# Patient Record
Sex: FEMALE | Race: WHITE
[De-identification: names, ages, dates, MRNs, and addresses within clinical notes are randomized per-mention and may not be internally consistent; named-entity substitution may affect disease eponyms.]

---

## 2018-03-01 ENCOUNTER — HOSPITAL ENCOUNTER (EMERGENCY)
Dept: HOSPITAL 58 - ED | Age: 68
Discharge: HOME | End: 2018-03-01

## 2018-03-01 VITALS — DIASTOLIC BLOOD PRESSURE: 75 MMHG | TEMPERATURE: 98.8 F | SYSTOLIC BLOOD PRESSURE: 122 MMHG

## 2018-03-01 VITALS — BODY MASS INDEX: 31.8 KG/M2

## 2018-03-01 DIAGNOSIS — F17.210: ICD-10-CM

## 2018-03-01 DIAGNOSIS — K52.9: ICD-10-CM

## 2018-03-01 DIAGNOSIS — E86.0: Primary | ICD-10-CM

## 2018-03-01 PROCEDURE — 87899 AGENT NOS ASSAY W/OPTIC: CPT

## 2018-03-01 PROCEDURE — 93010 ELECTROCARDIOGRAM REPORT: CPT

## 2018-03-01 PROCEDURE — 36415 COLL VENOUS BLD VENIPUNCTURE: CPT

## 2018-03-01 PROCEDURE — 87186 SC STD MICRODIL/AGAR DIL: CPT

## 2018-03-01 PROCEDURE — 87502 INFLUENZA DNA AMP PROBE: CPT

## 2018-03-01 PROCEDURE — 83690 ASSAY OF LIPASE: CPT

## 2018-03-01 PROCEDURE — 80053 COMPREHEN METABOLIC PANEL: CPT

## 2018-03-01 PROCEDURE — 87493 C DIFF AMPLIFIED PROBE: CPT

## 2018-03-01 PROCEDURE — 87086 URINE CULTURE/COLONY COUNT: CPT

## 2018-03-01 PROCEDURE — 93005 ELECTROCARDIOGRAM TRACING: CPT

## 2018-03-01 PROCEDURE — 87651 STREP A DNA AMP PROBE: CPT

## 2018-03-01 PROCEDURE — 96361 HYDRATE IV INFUSION ADD-ON: CPT

## 2018-03-01 PROCEDURE — 85025 COMPLETE CBC W/AUTO DIFF WBC: CPT

## 2018-03-01 PROCEDURE — 82150 ASSAY OF AMYLASE: CPT

## 2018-03-01 PROCEDURE — 87015 SPECIMEN INFECT AGNT CONCNTJ: CPT

## 2018-03-01 PROCEDURE — 81001 URINALYSIS AUTO W/SCOPE: CPT

## 2018-03-01 PROCEDURE — 99283 EMERGENCY DEPT VISIT LOW MDM: CPT

## 2018-03-01 PROCEDURE — 87045 FECES CULTURE AEROBIC BACT: CPT

## 2018-03-01 PROCEDURE — 96374 THER/PROPH/DIAG INJ IV PUSH: CPT

## 2018-03-01 NOTE — CT
EXAM:  CT abdomen pelvis without intravenous contrast 03/01/2018.  Sagittal and coronal reformatted i
mages obtained 

  

HISTORY:  Vomiting 

  

COMPARISON:  11/20/2017 

  

FINDINGS:  The liver and gallbladder show no acute abnormality.  Stable benign right adrenal adenoma 
and benign left adrenal myelolipoma. 

  

Nonobstructive left nephrolithiasis measures approximate 2 mm diameter.  No hydronephrosis.  The kidn
eys show no acute process. 

  

The spleen pancreas show no acute abnormality.  There is no bowel obstruction.  The appendix is jb
l. 

  

No free air free fluid. 

  

Air-fluid levels are present throughout the colon.  This may relate to enteritis/diarrhea. Correlate 
clinically. 

  

Severe degenerative disc disease at L3-L4.  No acute osseous abnormality. 

  

IMPRESSION: 

1.  Air-fluid levels of the ascending and transverse colon.  This could relate to enteritis/diarrhea.
  Correlate clinically. 

2.  No urinary or bowel obstruction and normal appendix. 

3.  Partially limited characterization due to the lack of intravenous contrast 

4.  Additional chronic findings as above.

## 2018-03-01 NOTE — ED.PDOC
General


ED Provider: 


Dr. YANE MOJICA-ER





Chief Complaint: Nausea/Vomiting


Stated Complaint: im dehydrated--mireya had vomiting and nonbloody diarhea for 2 

days


Time Seen by Physician: 04:40


Mode of Arrival: Walk-In


Information Source: Patient, Family


Exam Limitations: No limitations


Primary Care Provider: 


LUCY WHITLEY





Nursing and Triage Documentation Reviewed and Agree: Yes


Reviewed sepsis parameters & appropriate labs ordered?: Yes


System Inflammatory Response Syndrome: Not Applicable


Sepsis Protocol: 


For patient's 13 years and over:





Temp is 96.8 and below  and greater


Pulse >90 BPM


Resp >20/minute


Acutely Altered Mental Status





Are patient's symptoms suggestive of a new infection, such as:


   -Pneumonia


   -Skin, Soft Tissue


   -Endocarditis


   -UTI


   -Bone, Joint Infection


   -Implantable Device


   -Acute Abdominal Infection


   -Wound Infection


   -Meningitis


   -Blood Stream Catheter Infection


   -Unknown








GI Complaint Exam





- Vomiting/Diarrhea Complaint/Exam


Onset/Duration: 48hrs


Symptoms Are: Still present


Initial Severity: Mild


Current Severity: Moderate


Character of Vomiting: Reports: Non-bilious


Character of Diarrhea: Reports: Watery


Aggravating: Reports: None


Alleviating: Reports: Clear liquids


Associated Signs and Symptoms: Denies: Dizziness, Light-headedness, Melena, 

Hematemesis, Fever, Abdominal pain, Cramping


Related History: Reports: Recent antibiotics


Abdominal Findings: Present: None


Kussmaul Respirations Present: No


Differential Diagnoses: Dehydration, Viral Gastroenteritis, Bacterial 

Gastroenteritis, UTI





Review of Systems





- Review Of Systems


Constitutional: Reports: No symptoms


Eyes: Reports: No symptoms


Ears, Nose, Mouth, Throat: Reports: No symptoms


Respiratory: Reports: No symptoms


Cardiac: Reports: No symptoms


GI: Reports: Diarrhea, Nausea, Poor appetite, Vomiting


: Reports: No symptoms


Musculoskeletal: Reports: No symptoms


Skin: Reports: No symptoms


Neurological: Reports: No symptoms


Endocrine: Reports: No symptoms


Hematologic/Lymphatic: Reports: No symptoms


All Other Systems: Reviewed and Negative





Past Medical History





- Past Medical History


Previously Healthy: No


Endocrine: Reports: Unknown


Cardiovascular: Reports: Unknown


Respiratory: Reports: Unknown


Hematological: Reports: Unknown


Gastrointestinal: Reports: Unknown


Genitourinary: Reports: Unknown


Neuro/Psych: Reports: Unknown


Musculoskeletal: Reports: Unknown


Cancer: Reports: Unknown


Last Menstrual Period: 1977





- Surgical History


General Surgical History: Reports: Unknown





- Family History


Family History: Reports: Unknown





- Social History


Smoking Status: Current every day smoker, Light tobacco smoker


Hx Substance Use: No


Alcohol Screening: None


Lives: With family





- Immunizations


Tetanus Shot up to Date: Yes





Physical Exam





- Physical Exam


Appearance: Ill-appearing


Ill-appearing: Mild


Eyes: DURAN, EOMI, Conjunctiva clear


ENT: Dry mucosa


Neck: Supple


Respiratory: Airway patent, Breath sounds clear, Breath sounds equal, 

Respirations nonlabored


Cardiovascular: RRR


GI/: Soft


Musculoskeletal: Normal strength, ROM intact, No edema, No calf tenderness


Skin: Warm, Dry, Normal color


Neurological: Sensation intact, Motor intact, Reflexes intact, Cranial nerves 

intact, Alert, Oriented


Psychiatric: Affect appropriate, Mood appropriate





Interpretation





- Radiology Interpretation


Radiology Interpretation By: Radiologist


Radiology Results: Negative


Exam Interpreted: CT Scan





- EKG Interpretation


Time of EKG #1: 06:25


Rate: Tachy


Rhythm: Sinus


Ectopy: None


Axis: NL


ST Segment: Normal





Re-Evaluation





- Re-Evaluation


Time of Re-Evaluation: 06:25


Status: Improved


Vital Signs Stable: Yes


Pain Level: 0


Appearance: NAD


Lungs: Clear


Skin: Warm and Dry


Neuro: Alert and Oriented X3


CV: RRR





Critical Care Note





- Critical Care Note


Total Time (mins): 0





Course





- Course


Hematology/Chemistry: 


 03/01/18 05:50





 03/01/18 05:50


Orders, Labs, Meds: 


Lab Review











  03/01/18 03/01/18 03/01/18





  05:30 05:50 05:50


 


WBC   8.87 


 


RBC   4.50 


 


Hgb   14.1 


 


Hct   41.2 


 


MCV   91.6 


 


MCH   31.3 H 


 


MCHC   34.2 


 


RDW Coeff of Ayo   12.3 


 


Plt Count   228 


 


Immature Gran % (Auto)   0.3 


 


Neut % (Auto)   57.2 


 


Lymph % (Auto)   25.8 


 


Mono % (Auto)   15.1 H 


 


Eos % (Auto)   1.1 


 


Baso % (Auto)   0.5 


 


Immature Gran # (Auto)   0.0 


 


Neut # (Auto)   5.1 


 


Lymph # (Auto)   2.3 


 


Mono # (Auto)   1.3 


 


Eos # (Auto)   0.1 


 


Baso # (Auto)   0.0 


 


Sodium    139


 


Potassium    4.4


 


Chloride    101


 


Carbon Dioxide    25


 


Anion Gap    17.4


 


BUN    15


 


Creatinine    0.80


 


Estimated GFR (MDRD)    72.00


 


BUN/Creatinine Ratio    18.75


 


Glucose    264 H


 


Calcium    9.5


 


Total Bilirubin    0.9


 


AST    20


 


ALT    38


 


Alkaline Phosphatase    63


 


Total Protein    7.2


 


Albumin    3.6


 


Globulin    3.6


 


Albumin/Globulin Ratio    1.00


 


Amylase    49


 


Lipase    17


 


Influ A Molecular Assay  Negative by naat  


 


Influ B Molecular Assay  Negative by naat  








Orders











 Category Date Time Status


 


 EKG-(ED ONLY) Stat CARDIO  03/01/18 05:20 Completed


 


 C-DIFF MONITORING (NURSING) BID CARE  03/01/18 06:19 Active


 


 ED IV/MEDIPORT/POWERPORT .ONCE EMERGENCY  03/01/18 05:21 Active


 


 AMYLASE Stat LAB  03/01/18 05:50 Completed


 


 C. DIFFICILE Routine LAB  03/01/18 06:20 Ordered


 


 CBC W/ AUTO DIFF Stat LAB  03/01/18 05:50 Completed


 


 COMPREHENSIVE METABOLIC PANEL Stat LAB  03/01/18 05:50 Completed


 


 FLU A/B MOLECULAR Stat LAB  03/01/18 05:30 Completed


 


 LIPASE Stat LAB  03/01/18 05:50 Completed


 


 MOLECULAR GROUP A STREP Stat LAB  03/01/18 05:30 Completed


 


 STOOL CULTURE Stat LAB  03/01/18 Ordered


 


 URINALYSIS C & S IF INDICATED Stat LAB  03/01/18 06:16 Ordered


 


 0.9 % Sodium Chloride [Saline Flush] MEDS  03/01/18 05:21 Ordered





 1 syr IVF PRN PRN   


 


 Ondansetron HCl/Pf [Zofran 4 mg/2 ml] MEDS  03/01/18 05:21 Discontinued





 4 mg IVP ONCE STA   


 


 Sodium Chloride 0.9% [Sodium Chloride] 1,000 ml MEDS  03/01/18 05:21 

Discontinued





 IV BOLUS   


 


 Sodium Chloride 0.9% [Sodium Chloride] 1,000 ml MEDS  03/01/18 05:21 

Discontinued





 IV BOLUS   


 


 CT ABDOMEN/PELVIS WO CONTRAST Stat RADS  03/01/18 05:22 Completed








Medications











Generic Name Dose Route Start Last Admin





  Trade Name Freq  PRN Reason Stop Dose Admin


 


Sodium Chloride  1 syr  03/01/18 05:21  03/01/18 05:49





  Saline Flush  IVF   1 syr





  PRN PRN   Administration





  To flush IV   














Discontinued Medications














Generic Name Dose Route Start Last Admin





  Trade Name Freq  PRN Reason Stop Dose Admin


 


Sodium Chloride  1,000 mls @ 1,000 mls/hr  03/01/18 05:21  03/01/18 05:49





  Sodium Chloride  IV  03/01/18 06:20  1,000 mls/hr





  BOLUS STA   Administration


 


Sodium Chloride  1,000 mls @ 1,000 mls/hr  03/01/18 05:21  





  Sodium Chloride  IV  03/01/18 06:20  





  BOLUS STA   


 


Ondansetron HCl  4 mg  03/01/18 05:21  03/01/18 05:49





  Zofran 4 Mg/2 Ml  IVP  03/01/18 05:22  4 mg





  ONCE STA   Administration








i wanted to admit ms tsang but she declines0--she understands risk--her stool 

is foul smelling and she had antbx for dental infection approx 2 weeks ago ? c 

diff?


Vital Signs: 


 











  Temp Pulse Resp BP Pulse Ox


 


 03/01/18 04:32  98.8 F  143 H  20  122/75  93 L














Departure





- Departure


Time of Disposition: 06:27


Disposition: HOME SELF-CARE


Discharge Problem: 


 Dehydration





Instructions:  Enteritis (ED)


Condition: Good


Pt referred to PMD for follow-up: Yes


IPMP verified?: No


Additional Instructions: 


flagyl 250mg tid x 7days--zofran 4mg q 4hrs prn nausea #6--clear liquids and 

advance--recheck in 48hrs if not improved


Allergies/Adverse Reactions: 


Allergies





No Known Allergies Allergy (Unverified 03/01/18 04:42)


 








Home Medications: 


Ambulatory Orders





Aspirin [Aspirin EC] 81 mg PO DAILY 03/01/18 


Atenolol [Tenormin] 50 mg PO DAILY 03/01/18 


Cholecalciferol (Vitamin D3) [Vitamin D] 2,000 unit PO BID 03/01/18 


Estrogens, Conjugated [Premarin] 0.625 mg PO DAILY 03/01/18 


Fenofibrate Nanocrystallized [Fenofibrate] 145 mg PO BEDTIME 03/01/18 


Gabapentin [Neurontin] 100 mg PO BEDTIME 03/01/18 


Glipizide [Glucotrol] 20 mg PO BID 03/01/18 


Insulin Aspart [Novolog] 1 unit SQ AS DIRECTED 03/01/18 


Insulin Glargine,Hum.rec.anlog [Lantus] 28 - 35 unit SUBCUT BID 03/01/18 


Levothyroxine Sodium [Synthroid] 75 mcg PO QDAC 03/01/18 


Magnesium Oxide [Magnesium] 400 mg PO DAILY 03/01/18 


Metformin HCl [Glucophage] 1,000 mg PO BID 03/01/18 


Olmesartan Medoxomil [Benicar] 20 mg PO BEDTIME 03/01/18 


Omega-3/Dha/Epa/Fish Oil [Omega-3 Fish Oil] 1,000 mg PO TID 03/01/18 


Pantoprazole Sodium [Protonix] 40 mg PO BID 03/01/18 








Disposition Discussed With: Patient, Family